# Patient Record
Sex: MALE | Race: WHITE | URBAN - METROPOLITAN AREA
[De-identification: names, ages, dates, MRNs, and addresses within clinical notes are randomized per-mention and may not be internally consistent; named-entity substitution may affect disease eponyms.]

---

## 2017-07-21 ENCOUNTER — HOSPITAL ENCOUNTER (EMERGENCY)
Facility: CLINIC | Age: 62
Discharge: HOME | End: 2017-07-21
Attending: EMERGENCY MEDICINE | Admitting: EMERGENCY MEDICINE
Payer: COMMERCIAL

## 2017-07-21 VITALS
HEART RATE: 79 BPM | BODY MASS INDEX: 31.08 KG/M2 | OXYGEN SATURATION: 95 % | WEIGHT: 198 LBS | TEMPERATURE: 98.7 F | HEIGHT: 67 IN | SYSTOLIC BLOOD PRESSURE: 114 MMHG | DIASTOLIC BLOOD PRESSURE: 67 MMHG | RESPIRATION RATE: 21 BRPM

## 2017-07-21 DIAGNOSIS — I95.1 ORTHOSTATIC HYPOTENSION: ICD-10-CM

## 2017-07-21 DIAGNOSIS — R42 LIGHTHEADEDNESS: ICD-10-CM

## 2017-07-21 LAB
ANION GAP SERPL CALCULATED.3IONS-SCNC: 12 MMOL/L (ref 3–14)
BASOPHILS # BLD AUTO: 0 10E9/L (ref 0–0.2)
BASOPHILS NFR BLD AUTO: 0.5 %
BUN SERPL-MCNC: 16 MG/DL (ref 7–30)
CALCIUM SERPL-MCNC: 8.9 MG/DL (ref 8.5–10.1)
CHLORIDE SERPL-SCNC: 106 MMOL/L (ref 94–109)
CO2 SERPL-SCNC: 24 MMOL/L (ref 20–32)
CREAT SERPL-MCNC: 1.01 MG/DL (ref 0.66–1.25)
DIFFERENTIAL METHOD BLD: ABNORMAL
EOSINOPHIL # BLD AUTO: 0.1 10E9/L (ref 0–0.7)
EOSINOPHIL NFR BLD AUTO: 2.2 %
ERYTHROCYTE [DISTWIDTH] IN BLOOD BY AUTOMATED COUNT: 13.9 % (ref 10–15)
ETHANOL SERPL-MCNC: <0.01 G/DL
GFR SERPL CREATININE-BSD FRML MDRD: 75 ML/MIN/1.7M2
GLUCOSE SERPL-MCNC: 105 MG/DL (ref 70–99)
HCT VFR BLD AUTO: 37.8 % (ref 40–53)
HGB BLD-MCNC: 13.1 G/DL (ref 13.3–17.7)
IMM GRANULOCYTES # BLD: 0 10E9/L (ref 0–0.4)
IMM GRANULOCYTES NFR BLD: 0.2 %
INTERPRETATION ECG - MUSE: NORMAL
LYMPHOCYTES # BLD AUTO: 2 10E9/L (ref 0.8–5.3)
LYMPHOCYTES NFR BLD AUTO: 33.6 %
MCH RBC QN AUTO: 31.9 PG (ref 26.5–33)
MCHC RBC AUTO-ENTMCNC: 34.7 G/DL (ref 31.5–36.5)
MCV RBC AUTO: 92 FL (ref 78–100)
MONOCYTES # BLD AUTO: 0.7 10E9/L (ref 0–1.3)
MONOCYTES NFR BLD AUTO: 12.1 %
NEUTROPHILS # BLD AUTO: 3 10E9/L (ref 1.6–8.3)
NEUTROPHILS NFR BLD AUTO: 51.4 %
NRBC # BLD AUTO: 0 10*3/UL
NRBC BLD AUTO-RTO: 0 /100
PLATELET # BLD AUTO: 291 10E9/L (ref 150–450)
POTASSIUM SERPL-SCNC: 4.1 MMOL/L (ref 3.4–5.3)
RBC # BLD AUTO: 4.11 10E12/L (ref 4.4–5.9)
SODIUM SERPL-SCNC: 142 MMOL/L (ref 133–144)
WBC # BLD AUTO: 5.9 10E9/L (ref 4–11)

## 2017-07-21 PROCEDURE — 85025 COMPLETE CBC W/AUTO DIFF WBC: CPT | Performed by: EMERGENCY MEDICINE

## 2017-07-21 PROCEDURE — 96360 HYDRATION IV INFUSION INIT: CPT

## 2017-07-21 PROCEDURE — 80048 BASIC METABOLIC PNL TOTAL CA: CPT | Performed by: EMERGENCY MEDICINE

## 2017-07-21 PROCEDURE — 99284 EMERGENCY DEPT VISIT MOD MDM: CPT | Mod: 25

## 2017-07-21 PROCEDURE — 93005 ELECTROCARDIOGRAM TRACING: CPT

## 2017-07-21 PROCEDURE — 25000128 H RX IP 250 OP 636: Performed by: EMERGENCY MEDICINE

## 2017-07-21 PROCEDURE — 80320 DRUG SCREEN QUANTALCOHOLS: CPT | Performed by: EMERGENCY MEDICINE

## 2017-07-21 RX ORDER — SODIUM CHLORIDE 9 MG/ML
1000 INJECTION, SOLUTION INTRAVENOUS CONTINUOUS
Status: DISCONTINUED | OUTPATIENT
Start: 2017-07-21 | End: 2017-07-22 | Stop reason: HOSPADM

## 2017-07-21 RX ADMIN — SODIUM CHLORIDE 1000 ML: 9 INJECTION, SOLUTION INTRAVENOUS at 22:51

## 2017-07-21 ASSESSMENT — ENCOUNTER SYMPTOMS
SHORTNESS OF BREATH: 0
HEADACHES: 0
BACK PAIN: 0
FEVER: 0
COUGH: 0
WEAKNESS: 0
ABDOMINAL PAIN: 0
CHILLS: 0
DYSURIA: 0

## 2017-07-21 NOTE — ED AVS SNAPSHOT
Emergency Department    [address] [facility] [address]    Phone:  [phone]    Fax:  [phone]                                       Zachary Syed   MRN: [id number]    Department:   Emergency Department   Date of Visit:  7/21/2017           Patient Information     Date Of Birth          1955        Your diagnoses for this visit were:     Lightheadedness     Orthostatic hypotension        You were seen by Timbo Marin DO.      Follow-up Information     Follow up with [facility] In 3 days.    Specialty:  Family Practice    Contact information:    [address] [facility] [address] [phone]        Follow up with  Emergency Department.    Specialty:  EMERGENCY MEDICINE    Why:  If symptoms worsen    Contact information:    [address] [facility] [id number] [phone]        Discharge Instructions         Orthostatic Low Blood Pressure (Hypotension)  A blood pressure reading is made up of 2 numbers There is a top number over a bottom number. The top number is the systolic pressure. The bottom number is the diastolic pressure. A normal blood pressure is a systolic pressure less than 120 over a diastolic pressure less than 80. Low blood pressure (hypotension) is a blood pressure that is less than what is normal for you.  Orthostatic hypotension is a type of low blood pressure that occurs only when you change position from lying to standing. It can cause dizziness, lightheadedness, or fainting.  Some medicines can cause orthostatic hypotension. These include:    High blood pressure medicines    Water pills (diuretics)    Some heart medicines    Some antidepressants    Pain, anxiety, sedative, and sleeping medicines  Other causes include:    Dehydration from vomiting, diarrhea, or not getting enough fluids    Severe infection    High fever    Blood loss, such as bleeding from the stomach or intestines    Neurological  diseases that impair the autonomic nervous system  Treatment will depend on what is causing your low blood pressure.  Home care  Follow these guidelines when caring for yourself at home:    Rest until your symptoms get better.    Change positions slowly from lying to standing. When getting out of bed, sit on the side of the bed with your legs down for at least 30 seconds before standing. This gives your body time to adjust to the position change.    Follow the treatment plan described by your healthcare provider.  Follow-up care  Follow up with your healthcare provider, or as advised.  When to seek medical advice  Call your healthcare provider right away if any of these occur:    Dizziness, lightheadedness, or fainting    Black or red color in your stools or vomit    Diarrhea or vomiting that doesn t go away    You aren t able to eat or drink    Fever of 100.4 F (38 C) or higher, or as directed by your healthcare provider    Burning when you urinate    Foul-smelling urine  Date Last Reviewed: 12/1/2016 2000-2017 [employer]. [address] [place] [address], [place] [address]. All rights reserved. This information is not intended as a substitute for professional medical care. Always follow your healthcare professional's instructions.          24 Hour Appointment Hotline       To make an appointment at any [facility], call [phone] ([phone]). If you don't have a family doctor or clinic, we will help you find one. [facility] clinics are conveniently located to serve the needs of you and your family.             Review of your medicines      Our records show that you are taking the medicines listed below. If these are incorrect, please call your family doctor or clinic.        Dose / Directions Last dose taken    amitriptyline 75 MG tablet   Commonly known as:  ELAVIL   Dose:  1 tablet        Take 1 tablet by mouth At Bedtime.   Refills:  0        aspirin 81 MG tablet   Dose:  1 tablet         Take 1 tablet by mouth daily.   Refills:  0        CRESTOR 10 MG tablet   Dose:  1 tablet   Generic drug:  rosuvastatin        Take 1 tablet by mouth daily.   Refills:  0        lisinopril 10 MG tablet   Commonly known as:  PRINIVIL/ZESTRIL   Dose:  10 mg        Take 10 mg by mouth daily.   Refills:  0        oxyCODONE 5 MG capsule   Commonly known as:  OXY-IR   Dose:  5-10 mg   Quantity:  30 capsule        Take 1-2 capsules by mouth every 4 hours as needed.   Refills:  0        priLOSEC 20 MG CR capsule   Dose:  1 capsule   Generic drug:  omeprazole        Take 1 capsule by mouth daily.   Refills:  0        propranolol 80 MG 24 hr capsule   Commonly known as:  INDERAL LA   Dose:  1 capsule        Take 1 capsule by mouth daily.   Refills:  0        WELLBUTRIN 75 MG tablet   Dose:  1 tablet   Generic drug:  buPROPion        Take 1 tablet by mouth daily.   Refills:  0                Procedures and tests performed during your visit     Alcohol ethyl    Basic metabolic panel    CBC with platelets differential    EKG 12 lead    Orthostatic blood pressure and pulse      Orders Needing Specimen Collection     None      Pending Results     No orders found from 7/19/2017 to 7/22/2017.            Pending Culture Results     No orders found from 7/19/2017 to 7/22/2017.            Pending Results Instructions     If you had any lab results that were not finalized at the time of your Discharge, you can call the ED Lab Result RN at [phone]. You will be contacted by this team for any positive Lab results or changes in treatment. The nurses are available 7 days a week from 10A to 6:30P.  You can leave a message 24 hours per day and they will return your call.        Test Results From Your Hospital Stay        7/21/2017 11:08 PM      Component Results     Component Value Ref Range & Units Status    WBC 5.9 4.0 - 11.0 10e9/L Final    RBC Count 4.11 (L) 4.4 - 5.9 10e12/L Final    Hemoglobin 13.1 (L) 13.3 - 17.7 g/dL Final     Hematocrit 37.8 (L) 40.0 - 53.0 % Final    MCV 92 78 - 100 fl Final    MCH 31.9 26.5 - 33.0 pg Final    MCHC 34.7 31.5 - 36.5 g/dL Final    RDW 13.9 10.0 - 15.0 % Final    Platelet Count 291 150 - 450 10e9/L Final    Diff Method Automated Method  Final    % Neutrophils 51.4 % Final    % Lymphocytes 33.6 % Final    % Monocytes 12.1 % Final    % Eosinophils 2.2 % Final    % Basophils 0.5 % Final    % Immature Granulocytes 0.2 % Final    Nucleated RBCs 0 0 /100 Final    Absolute Neutrophil 3.0 1.6 - 8.3 10e9/L Final    Absolute Lymphocytes 2.0 0.8 - 5.3 10e9/L Final    Absolute Monocytes 0.7 0.0 - 1.3 10e9/L Final    Absolute Eosinophils 0.1 0.0 - 0.7 10e9/L Final    Absolute Basophils 0.0 0.0 - 0.2 10e9/L Final    Abs Immature Granulocytes 0.0 0 - 0.4 10e9/L Final    Absolute Nucleated RBC 0.0  Final         7/21/2017 11:25 PM      Component Results     Component Value Ref Range & Units Status    Sodium 142 133 - 144 mmol/L Final    Potassium 4.1 3.4 - 5.3 mmol/L Final    Chloride 106 94 - 109 mmol/L Final    Carbon Dioxide 24 20 - 32 mmol/L Final    Anion Gap 12 3 - 14 mmol/L Final    Glucose 105 (H) 70 - 99 mg/dL Final    Urea Nitrogen 16 7 - 30 mg/dL Final    Creatinine 1.01 0.66 - 1.25 mg/dL Final    GFR Estimate 75 >60 mL/min/1.7m2 Final    Non  GFR Calc    GFR Estimate If Black >90   GFR Calc   >60 mL/min/1.7m2 Final    Calcium 8.9 8.5 - 10.1 mg/dL Final         7/21/2017 11:25 PM      Component Results     Component Value Ref Range & Units Status    Ethanol g/dL <0.01 <0.01 g/dL Final                Clinical Quality Measure: Blood Pressure Screening     Your blood pressure was checked while you were in the emergency department today. The last reading we obtained was  BP: 137/86 . Please read the guidelines below about what these numbers mean and what you should do about them.  If your systolic blood pressure (the top number) is less than 120 and your diastolic blood pressure (the  "bottom number) is less than 80, then your blood pressure is normal. There is nothing more that you need to do about it.  If your systolic blood pressure (the top number) is 120-139 or your diastolic blood pressure (the bottom number) is 80-89, your blood pressure may be higher than it should be. You should have your blood pressure rechecked within a year by a primary care provider.  If your systolic blood pressure (the top number) is 140 or greater or your diastolic blood pressure (the bottom number) is 90 or greater, you may have high blood pressure. High blood pressure is treatable, but if left untreated over time it can put you at risk for heart attack, stroke, or kidney failure. You should have your blood pressure rechecked by a primary care provider within the next 4 weeks.  If your provider in the emergency department today gave you specific instructions to follow-up with your doctor or provider even sooner than that, you should follow that instruction and not wait for up to 4 weeks for your follow-up visit.        Thank you for choosing [facility]       Thank you for choosing [facility] for your care. Our goal is always to provide you with excellent care. Hearing back from our patients is one way we can continue to improve our services. Please take a few minutes to complete the written survey that you may receive in the mail after you visit with us. Thank you!        Quick [employer] Information     [employer] lets you send messages to your doctor, view your test results, renew your prescriptions, schedule appointments and more. To sign up, go to [email or web address] . Click on \"Log in\" on the left side of the screen, which will take you to the Welcome page. Then click on \"Sign up Now\" on the right side of the page.     You will be asked to enter the access code listed below, as well as some personal information. Please follow the directions to create your username and password.     Your access code is: " [email or web address]  Expires: 10/19/2017 11:42 PM     Your access code will  in 90 days. If you need help or a new code, please call your [facility] clinic or [phone].        Care EveryWhere ID     This is your Care EveryWhere ID. This could be used by other organizations to access your [facility] medical records  [id number]        Equal Access to Services     ANDER CUMMINGS : Hadii jodi herndon Sokt, waaxda luqadaha, qaybta kaalmada adejigneshda, stan frost . So [facility] [phone].    ATENCIÓN: Si habla español, tiene a centeno disposición servicios gratuitos de asistencia lingüística. Llame al [phone].    We comply with applicable federal civil rights laws and [place] laws. We do not discriminate on the basis of race, color, national origin, age, disability sex, sexual orientation or gender identity.            After Visit Summary       This is your record. Keep this with you and show to your community pharmacist(s) and doctor(s) at your next visit.

## 2017-07-21 NOTE — ED AVS SNAPSHOT
Emergency Department    [address] [place] [address]    Phone:  [phone]    Fax:  [phone]                                       Zachary Syed   MRN: [id number]    Department:   Emergency Department   Date of Visit:  7/21/2017           After Visit Summary Signature Page     I have received my discharge instructions, and my questions have been answered. I have discussed any challenges I see with this plan with the nurse or doctor.    ..........................................................................................................................................  Patient/Patient Representative Signature      ..........................................................................................................................................  Patient Representative Print Name and Relationship to Patient    ..................................................               ................................................  Date                                            Time    ..........................................................................................................................................  Reviewed by Signature/Title    ...................................................              ..............................................  Date                                                            Time

## 2017-07-22 NOTE — DISCHARGE INSTRUCTIONS
Orthostatic Low Blood Pressure (Hypotension)  A blood pressure reading is made up of 2 numbers There is a top number over a bottom number. The top number is the systolic pressure. The bottom number is the diastolic pressure. A normal blood pressure is a systolic pressure less than 120 over a diastolic pressure less than 80. Low blood pressure (hypotension) is a blood pressure that is less than what is normal for you.  Orthostatic hypotension is a type of low blood pressure that occurs only when you change position from lying to standing. It can cause dizziness, lightheadedness, or fainting.  Some medicines can cause orthostatic hypotension. These include:    High blood pressure medicines    Water pills (diuretics)    Some heart medicines    Some antidepressants    Pain, anxiety, sedative, and sleeping medicines  Other causes include:    Dehydration from vomiting, diarrhea, or not getting enough fluids    Severe infection    High fever    Blood loss, such as bleeding from the stomach or intestines    Neurological diseases that impair the autonomic nervous system  Treatment will depend on what is causing your low blood pressure.  Home care  Follow these guidelines when caring for yourself at home:    Rest until your symptoms get better.    Change positions slowly from lying to standing. When getting out of bed, sit on the side of the bed with your legs down for at least 30 seconds before standing. This gives your body time to adjust to the position change.    Follow the treatment plan described by your healthcare provider.  Follow-up care  Follow up with your healthcare provider, or as advised.  When to seek medical advice  Call your healthcare provider right away if any of these occur:    Dizziness, lightheadedness, or fainting    Black or red color in your stools or vomit    Diarrhea or vomiting that doesn t go away    You aren t able to eat or drink    Fever of 100.4 F (38 C) or higher, or as directed by your  healthcare provider    Burning when you urinate    Foul-smelling urine  Date Last Reviewed: 12/1/2016 2000-2017 [employer] [address] [place] [address], [place] [address]. All rights reserved. This information is not intended as a substitute for professional medical care. Always follow your healthcare professional's instructions.

## 2017-07-22 NOTE — ED PROVIDER NOTES
History     Chief Complaint:  Lightheadedness    HPI   Zachary Syed is a 62 year old male who presents with lightheadedness. The patient reports that he had 3 drinks earlier, then went to [employer] with his brother and had another drink. He went outside and was walking back in and started to feel lightheaded. He did not have dizziness. He did not have a headache, palpitations, chest pain, short of breath or any other symptoms. He reports that he had had food earlier in the evening but was not eating while at [employer]. The patient was about to sit down on his chair and missed the chair and fell on his buttocks. The patient did not hit his head and had no loss of consciousness. The patient takes aspirin daily but no other anticoagulants. EMS was called and a blood sugar is checked and found to be 99. EMS did 2 EKGs that I reviewed that were unremarkable. The patient here feels slightly lightheaded when standing but otherwise has no other symptoms. The patient has had a prior stroke that has affected his vision slightly but he has no other permanent neurologic deficit from that stroke. He reports his vision today is at baseline.    Allergies:  No Known Allergies     Medications:      amitriptyline (ELAVIL) 75 MG tablet   propranolol (INDERAL LA) 80 MG capsule   aspirin 81 MG tablet   oxyCODONE (OXY-IR) 5 MG capsule   lisinopril (PRINIVIL,ZESTRIL) 10 MG tablet   omeprazole (PRILOSEC) 20 MG capsule   rosuvastatin (CRESTOR) 10 MG tablet   buPROPion (WELLBUTRIN) 75 MG tablet       Past Medical History:    Past Medical History:   Diagnosis Date     High cholesterol      Hypertension        Past Surgical History:    Appendectomy  Hernia repair    Family History:    No family history on file.    Social History:  Marital Status:  Unknown [6]  Social History   Substance Use Topics     Smoking status: Former Smoker     Quit date: 1/21/2012     Smokeless tobacco: Former User     Quit date: 1/21/1975     Alcohol use 3.0  "oz/week     6 Standard drinks or equivalent per week      Comment: 2 drinks/day,  \"I had a few more today\"        Review of Systems   Constitutional: Negative for chills and fever.   Respiratory: Negative for cough and shortness of breath.    Cardiovascular: Negative for chest pain.   Gastrointestinal: Negative for abdominal pain.   Genitourinary: Negative for dysuria.   Musculoskeletal: Negative for back pain.   Neurological: Negative for weakness and headaches.   All other systems reviewed and are negative.      Physical Exam   First Vitals:  BP: 137/86  Pulse: 79  Temp: 98.7  F (37.1  C)  Resp: 18  Height: 170.2 cm (5' 7\")  Weight: 89.8 kg (198 lb)  SpO2: 95 %      Patient Vitals for the past 24 hrs:   BP Temp Temp src Pulse Resp SpO2 Height Weight   07/21/17 2253 - 98.7  F (37.1  C) Oral - - - - -   07/21/17 2155 137/86 - - 79 18 95 % 1.702 m (5' 7\") 89.8 kg (198 lb)     Orthostatic vitals:  Laying: HR 77 /70  Standing: HR 80 87/61    Physical Exam  Physical Exam   General:  Sitting on bed with daughter and family at bedside.   HENT:  No obvious trauma to head  Right Ear:  External ear normal.   Left Ear:  External ear normal.   Nose:  Nose normal.   Eyes:  Conjunctivae and EOM are normal. Pupils are equal, round, and reactive.   Neck: Normal range of motion. Neck supple. No tracheal deviation present.   CV:  Normal heart sounds. No murmur heard.  Pulm/Chest: Effort normal and breath sounds normal.   Abd: Soft. No distension. There is no tenderness. There is no rigidity, no rebound and no guarding.   M/S: Normal range of motion.   Neuro: Alert. GCS 15. CN II-XII Grossly intact, no pronator drift, normal finger-nose-finger, visual fields intact by confrontation. Muscle strength is +5 proximal and distal in the bilateral upper and lower extremities. No dysarthria. Normal palm up, palm down.  Skin: Skin is warm and dry. No rash noted. Not diaphoretic.   Psych: Normal mood and affect. Behavior is normal. "     Emergency Department Course   ECG:       EKG Interpretation:      Interpreted by Timbo Marin  Time reviewed:2229   Symptoms at time of EKG: None   Rhythm: Normal sinus   Rate: Normal  Axis: Normal  Ectopy: None  Conduction: Normal  ST Segments/ T Waves: No ST-T wave changes and No acute ischemic changes  Q Waves: None  Comparison to prior: Unchanged from 01/21/2013    Clinical Impression: normal EKG    Laboratory:  CBC: HGB 13.1 (low), o/w WNL (WBC 5.9, )  BMP: Glucose 105(high), o/w WNL (Cr 1.01)  ETOH: <0.01    Interventions:  NS 1,000 ml IV bolus    Emergency Department Course:  The patient presented to the emergency department and I reviewed the nursing notes and vital signs. I evaluated the patient and performed an exam on the patient which included a GCS of 15.  I discussed with the patient my recommendation for labs,cardiac monitoring, EKG and the above medications/interventions.  They consented to this plan.     2332: Pt re-examined and feels much better with IV fluids, is eating/drinking and ambulates without concern now.    Findings and plan explained to the patient. Patient discharged home with instructions regarding supportive care, medications, and reasons to return. The importance of close follow-up was reviewed.     Impression & Plan    Medical Decision Making:  Zachary Syed is a very pleasant 62 year old year old patient who presents to the emergency department with concern of lightheadedness. The patient admits to drinking alcohol and some water but perhaps not enough. The patient felt lightheaded after walking. He had no other symptoms such as palpitations, chest pain or shortness of breath. The patient here has no symptoms. He is found to have positive orthostatic vital signs consistent with orthostatic hypotension. He was provided IV and p.o. fluids and felt significantly better.The EKG was reviewed and shows no evidence of Brugada syndrome, Mullins-Parkinson-White,  hypertrophic cardiomyopathy or prolonged QTc syndrome. He had no chest pain during this episode, prior to it or currently and I do not believe that this is cardiac. He is kept on the monitor while in the emergency department and had no arrhythmias. I reviewed the risk and benefit of performing imaging of the brain given his history of small stroke in the past. He has no focal neurologic deficit at this time and after reviewing the risk and benefit of imaging the patient declined. He does not describe his symptoms as dizziness, but only lightheadedness. He has no evidence of dysdiadochokinesis on exam. Since he has no neurologic deficit and he had positive orthostatic vital signs are believe this is reasonable. The patient agrees to follow-up with his primary and also return with any headache, chest pain, short of breath or any other concern.    The treatment plan was discussed with the patient and they expressed understanding of this plan and consented to the plan.  In addition, the patient will return to the emergency department if their symptoms persist, worsen, if new symptoms arise or if there is any concern as other pathology may be present that is not evident at this time. They also understand the importance of close follow up in the clinic and if unable to do so will return to the emergency department for a reevaluation. All questions were answered.    Diagnosis:    ICD-10-CM    1. Lightheadedness R42 CBC with platelets differential     Basic metabolic panel     Alcohol ethyl   2. Orthostatic hypotension I95.1        Disposition:  Discharged to home    Discharge Medications:  New Prescriptions    No medications on file         Timbo Marin, DO  7/21/2017    EMERGENCY DEPARTMENT       Timbo Marin, DO  07/21/17 5940

## 2017-07-22 NOTE — ED NOTES
Bed: ED02  Expected date: 7/21/17  Expected time: 9:43 PM  Means of arrival: Ambulance  Comments:  531 syncope